# Patient Record
Sex: MALE | Race: WHITE | NOT HISPANIC OR LATINO | Employment: FULL TIME | URBAN - METROPOLITAN AREA
[De-identification: names, ages, dates, MRNs, and addresses within clinical notes are randomized per-mention and may not be internally consistent; named-entity substitution may affect disease eponyms.]

---

## 2018-12-14 ENCOUNTER — HOSPITAL ENCOUNTER (EMERGENCY)
Facility: HOSPITAL | Age: 53
Discharge: HOME | End: 2018-12-14
Attending: EMERGENCY MEDICINE
Payer: COMMERCIAL

## 2018-12-14 VITALS
WEIGHT: 212 LBS | TEMPERATURE: 98 F | HEIGHT: 70 IN | OXYGEN SATURATION: 96 % | BODY MASS INDEX: 30.35 KG/M2 | RESPIRATION RATE: 22 BRPM | SYSTOLIC BLOOD PRESSURE: 127 MMHG | DIASTOLIC BLOOD PRESSURE: 76 MMHG | HEART RATE: 86 BPM

## 2018-12-14 DIAGNOSIS — R07.9 CHEST PAIN: ICD-10-CM

## 2018-12-14 LAB
ALBUMIN SERPL BCP-MCNC: 4 G/DL
ALP SERPL-CCNC: 62 U/L
ALT SERPL W/O P-5'-P-CCNC: 38 U/L
ANION GAP SERPL CALC-SCNC: 13 MMOL/L
AST SERPL-CCNC: 24 U/L
BASOPHILS # BLD AUTO: NORMAL K/UL
BASOPHILS NFR BLD: 0 %
BILIRUB SERPL-MCNC: 0.4 MG/DL
BNP SERPL-MCNC: <10 PG/ML
BUN SERPL-MCNC: 12 MG/DL
CALCIUM SERPL-MCNC: 9.3 MG/DL
CHLORIDE SERPL-SCNC: 103 MMOL/L
CO2 SERPL-SCNC: 18 MMOL/L
CREAT SERPL-MCNC: 0.9 MG/DL
DIFFERENTIAL METHOD: NORMAL
EOSINOPHIL # BLD AUTO: NORMAL K/UL
EOSINOPHIL NFR BLD: 1 %
ERYTHROCYTE [DISTWIDTH] IN BLOOD BY AUTOMATED COUNT: 12.7 %
EST. GFR  (AFRICAN AMERICAN): >60 ML/MIN/1.73 M^2
EST. GFR  (NON AFRICAN AMERICAN): >60 ML/MIN/1.73 M^2
GLUCOSE SERPL-MCNC: 297 MG/DL
HCT VFR BLD AUTO: 43.5 %
HGB BLD-MCNC: 15.2 G/DL
LYMPHOCYTES # BLD AUTO: NORMAL K/UL
LYMPHOCYTES NFR BLD: 24 %
MCH RBC QN AUTO: 29.2 PG
MCHC RBC AUTO-ENTMCNC: 34.9 G/DL
MCV RBC AUTO: 84 FL
MONOCYTES # BLD AUTO: NORMAL K/UL
MONOCYTES NFR BLD: 5 %
NEUTROPHILS # BLD AUTO: NORMAL K/UL
NEUTROPHILS NFR BLD: 69 %
NEUTS BAND NFR BLD MANUAL: 1 %
PLATELET # BLD AUTO: 169 K/UL
PLATELET BLD QL SMEAR: NORMAL
PMV BLD AUTO: 10.8 FL
POTASSIUM SERPL-SCNC: 4.5 MMOL/L
PROT SERPL-MCNC: 7 G/DL
RBC # BLD AUTO: 5.21 M/UL
SODIUM SERPL-SCNC: 134 MMOL/L
TROPONIN I SERPL DL<=0.01 NG/ML-MCNC: <0.006 NG/ML
WBC # BLD AUTO: 4.93 K/UL

## 2018-12-14 PROCEDURE — 93010 ELECTROCARDIOGRAM REPORT: CPT | Mod: ,,, | Performed by: INTERNAL MEDICINE

## 2018-12-14 PROCEDURE — 99285 EMERGENCY DEPT VISIT HI MDM: CPT | Mod: 25

## 2018-12-14 PROCEDURE — 85027 COMPLETE CBC AUTOMATED: CPT

## 2018-12-14 PROCEDURE — 80053 COMPREHEN METABOLIC PANEL: CPT

## 2018-12-14 PROCEDURE — 63600175 PHARM REV CODE 636 W HCPCS: Performed by: EMERGENCY MEDICINE

## 2018-12-14 PROCEDURE — 83880 ASSAY OF NATRIURETIC PEPTIDE: CPT

## 2018-12-14 PROCEDURE — 84484 ASSAY OF TROPONIN QUANT: CPT

## 2018-12-14 PROCEDURE — 25000003 PHARM REV CODE 250: Performed by: EMERGENCY MEDICINE

## 2018-12-14 PROCEDURE — 93005 ELECTROCARDIOGRAM TRACING: CPT

## 2018-12-14 PROCEDURE — 96374 THER/PROPH/DIAG INJ IV PUSH: CPT

## 2018-12-14 PROCEDURE — 85007 BL SMEAR W/DIFF WBC COUNT: CPT

## 2018-12-14 RX ORDER — KETOROLAC TROMETHAMINE 30 MG/ML
30 INJECTION, SOLUTION INTRAMUSCULAR; INTRAVENOUS
Status: COMPLETED | OUTPATIENT
Start: 2018-12-14 | End: 2018-12-14

## 2018-12-14 RX ADMIN — NITROGLYCERIN 1 INCH: 20 OINTMENT TOPICAL at 06:12

## 2018-12-14 RX ADMIN — KETOROLAC TROMETHAMINE 30 MG: 30 INJECTION, SOLUTION INTRAMUSCULAR at 07:12

## 2018-12-14 NOTE — ED PROVIDER NOTES
Encounter Date: 12/14/2018       History     Chief Complaint   Patient presents with    Chest Pain     Patient presents to the ED with reports of having chest pain.      The history is provided by the patient.   Chest Pain   The current episode started 3 to 5 hours ago. Chest pain occurs constantly. The chest pain is improving. Associated with: movements. Quality: discomfort. The pain does not radiate. Chest pain is worsened by certain positions. Pertinent negatives for primary symptoms include no fever, no fatigue, no syncope, no shortness of breath, no cough, no palpitations, no abdominal pain, no nausea, no vomiting, no dizziness and no altered mental status.   Pertinent negatives for associated symptoms include no diaphoresis, no lower extremity edema and no near-syncope. He tried NSAIDs for the symptoms. Risk factors include male gender (DM).   His past medical history is significant for diabetes.   Pertinent negatives for past medical history include no CAD, no CHF and no MI.     Review of patient's allergies indicates:  No Known Allergies  No past medical history on file.  No past surgical history on file.  No family history on file.  Social History     Tobacco Use    Smoking status: Not on file   Substance Use Topics    Alcohol use: Not on file    Drug use: Not on file     Review of Systems   Constitutional: Negative for diaphoresis, fatigue and fever.   Respiratory: Negative for cough and shortness of breath.    Cardiovascular: Positive for chest pain. Negative for palpitations, syncope and near-syncope.   Gastrointestinal: Negative for abdominal pain, nausea and vomiting.   Neurological: Negative for dizziness.   All other systems reviewed and are negative.      Physical Exam     Initial Vitals [12/14/18 0545]   BP Pulse Resp Temp SpO2   (!) 184/93 94 16 98.2 °F (36.8 °C) 98 %      MAP       --         Physical Exam    Nursing note and vitals reviewed.  Constitutional: He appears well-developed and  well-nourished. He is not diaphoretic. No distress.   HENT:   Head: Normocephalic.   Mouth/Throat: Oropharynx is clear and moist.   Eyes: Conjunctivae and EOM are normal. Pupils are equal, round, and reactive to light.   Neck: Normal range of motion. Neck supple.   Cardiovascular: Normal rate, regular rhythm, normal heart sounds and intact distal pulses.   No murmur heard.  Pulmonary/Chest: Breath sounds normal. No respiratory distress. He has no wheezes. He has no rhonchi. He has no rales. He exhibits no tenderness.   Abdominal: Soft. He exhibits no distension and no mass. There is no tenderness. There is no rebound and no guarding.   Musculoskeletal: Normal range of motion. He exhibits no edema or tenderness.   Neurological: He is alert and oriented to person, place, and time. He has normal strength.   Skin: Skin is warm and dry. Capillary refill takes less than 2 seconds. No rash noted.   Psychiatric: He has a normal mood and affect.         ED Course   Procedures  Labs Reviewed   COMPREHENSIVE METABOLIC PANEL - Abnormal; Notable for the following components:       Result Value    Sodium 134 (*)     CO2 18 (*)     Glucose 297 (*)     All other components within normal limits   CBC W/ AUTO DIFFERENTIAL   TROPONIN I   B-TYPE NATRIURETIC PEPTIDE     EKG Readings: (Independently Interpreted)   Initial Reading: No STEMI. Rhythm: Normal Sinus Rhythm. Heart Rate: 88. Ectopy: No Ectopy. Conduction: RBBB. ST Segments: Normal ST Segments. Clinical Impression: Normal Sinus Rhythm with RBBB       Imaging Results          X-Ray Chest AP Portable (Final result)  Result time 12/14/18 06:34:00    Final result by Karina Simon MD (12/14/18 06:34:00)                 Impression:      No acute intrathoracic abnormality identified on this single radiographic view of the chest.      Electronically signed by: Karina Simon MD  Date:    12/14/2018  Time:    06:34             Narrative:    EXAMINATION:  XR CHEST AP PORTABLE    CLINICAL  HISTORY:  Chest Pain;    TECHNIQUE:  Single frontal view of the chest was performed.    COMPARISON:  None    FINDINGS:  Monitoring leads overlie the chest.  The cardiomediastinal silhouette is within normal limits. The visualized airway is unremarkable.  The lungs appear symmetrically aerated without definite focal alveolar consolidation. No large pleural effusion or pneumothorax is appreciated.Visualized osseous structures demonstrate no acute abnormality.                                 Medical Decision Making:   Initial Assessment:   Pain present.  Hypertensive.  Appears comfortable.  Exam unremarkable  Differential Diagnosis:   MI, CWP, GERD, PE, PTX, PNA  Independently Interpreted Test(s):   I have ordered and independently interpreted EKG Reading(s) - see prior notes  Clinical Tests:   Lab Tests: Ordered  Radiological Study: Ordered  Medical Tests: Reviewed and Ordered  ED Management:  Discussed with Dr. Isbell with CP workup pending.    Workup negative with a troponin of 0.  EKG shows acute abnormalities.  Patient says he feels pain to left side of chest when he turns his head to the right.  This leads me to believe the etiology is muscular.  He was given 30 mg of Toradol intravenously here in the ED.  I have suggested he continue Motrin as needed for pain, but also to follow up with his primary physician as soon as able for recheck, further treatment as warranted and/or referral.  He has also been instructed to return here immediately for any worsening of his symptoms.                      Clinical Impression:   The encounter diagnosis was Chest pain.                             Guy J. Lefort, MD  12/17/18 [id number]

## 2018-12-14 NOTE — ED NOTES
"PT PRESENTS TO ED WITH SIGNIFICANT OTHER C/O LEFT SIDED NON-REPRODUCIBLE CHEST "PRESSURE" X1 WEEK. DENIES CARDIAC HISTORY. PT STATES "AS LONG AS I'M LYING HERE LIKE THIS THE PAIN ISN'T BAD. IT'S LIKE A 2."  " Have you seen cholesterol results?

## 2018-12-14 NOTE — ED NOTES
Reports received from SAMI Benedict. Assumed care of pt, resting in no acute distress. Will continue to monitor.